# Patient Record
(demographics unavailable — no encounter records)

---

## 2025-05-19 NOTE — HISTORY OF PRESENT ILLNESS
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

## 2025-05-19 NOTE — COUNSELING
[Potential consequences of obesity discussed] : Potential consequences of obesity discussed [Benefits of weight loss discussed] : Benefits of weight loss discussed [Structured Weight Management Program suggested:] : Structured weight management program suggested [Weigh Self Weekly] : weigh self weekly [Decrease Portions] : decrease portions [Keep Food Diary] : keep food diary [Good understanding] : Patient has a good understanding of disease, goals and obesity follow-up plan

## 2025-05-19 NOTE — REVIEW OF SYSTEMS
[TextEntry] : Head: Denies headache, dizziness Eyes: No acute  vision problem  Ears: Denies hearing loss, tinnitus, no ear pain Nose: Denies nasal obstruction or any discharges Neck: Denies stiffness or muscle tenderness Chest: Denies cough, SOB CV: Denies chest pain, palpitation Abdominal: Denies abdominal pain, change in bowel movement Neurology: Denies  changes in mental status, seizure, no neurological deficit Musculosceletal- joint swelling , left knee pain

## 2025-05-19 NOTE — PHYSICAL EXAM
[TextEntry] : General: alert, awake, oriented  X 3, in no acute distress.  Eyes: PERRLA, EOM's  are intact b/l , conjunctiva clear,  Ears: Ear canal  is  clear b/l , no discharge. Tympanic membrane is   intact b/l Nose: Mucosa normal, no obstruction.  Throat: Clear, no exudates, no lesions.  Neck: Supple, no masses,  Chest: Lungs are  clear b/l , no rales, no rhonchi, no wheezes.  Heart: RR, no murmurs, no rubs, no gallops.  Abdomen: Soft, no tenderness in all quadrant , no masses, BS normal.  Extremities: FROM, no deformities, mild left knee  edema, no erythema.